# Patient Record
(demographics unavailable — no encounter records)

---

## 2025-03-26 NOTE — DISCUSSION/SUMMARY
[Add Food/Vitamin] : add ~M [Hepatitis B In Hospital] : Hepatitis B administered while in the hospital [No Vaccines] : no vaccines needed [FreeTextEntry1] : Well , father thinks getting less jaundiced.  Jaundice, Tc bili level 14. RTO right away if think getting more yellow.  RTO in 5 d if looking less yellow daily.  Reviewed jaundice in detail.   Nursing, supplement until next visit then to only br milk if can.   Mother to speak to her docs re  possible need for another MMR, and must get Varivax at some point   explained. Her doctors will advise.   Anticipatory guidance, safety reviewed in detail.  [place] safety handout given. Safe crib, back sleep. No soft bedding, no pillows, stuffed animals or fluffy items in crib.   Do not overdress. Do not swaddle after 1 mos old. No tight swaddling, do not swaddle legs. Do not put baby on adult bed or sofa, prevention of falls discussed. do not put pillows around baby. Keep in crib, or other safe location. Limit visitors.  No sick visitors.  Keep little kids away from .  Avoid contact with people with cold sores. Fever = rectal temp 100.4 or more, go to ER immediately for fever. If think  is sick, with or without a fever, see a doctor right away. No honey until after age 1 year old.  Feeding discussed in detail. Vitamin D 400 IU per day. Car seat use disc, proper use.  Always keep baby fully buckled when in car seat, whether in car or out of car.  Take out of car seat when home. Watch for head falling forward in car seat. Do not raise head of bed. Do not leave baby in swing or baby seat or car seat unobserved.  Take care that head cannot fall forward and cause trouble breathing. Take baby out and put on back on flat mattress in crib or bassinet when not directly observed. Smoke detector, CO detector, fire extinguisher in the kitchen advised, water temp < 125 F. Never shake a baby.

## 2025-03-26 NOTE — HISTORY OF PRESENT ILLNESS
[NO] : No [FreeTextEntry1] : Parents here 6 day old baby born 3/20/25 10:29 hrs.  Bili 8.3 below phototx level leaving hospital.  Seen after discharge 3 d ago,Total  bili 13.4 direct 0.4 on 3/23/25 2 AM, as parents concerned over jaundice. mother nursing, child latching and sucking well. Supplementing with formula as advised when went for bili level.  Reviewed all records of . BW 3.19 kg  7-0 DW  3010 g   6-10  -5.6 38-6  8 Peds to LDR for meconium, baby AGA , not a breech.  Mother 25 yo O+/O+/Linda neg HIV neg, RPR neg Hep B neg, GBS neg. Rubella unknown ( reviewed mother's prenatal labs on her phone with mom: rubella immune, mumps equivocal, measles immune, varicella NON immune, Hep B immune.  Mother to FU with her doctor re Varicella vaccine, and ?MMR.  Heb B vacc given to baby Mom no RSV vaccine.  Screens passed.  G6PD nl

## 2025-03-26 NOTE — PHYSICAL EXAM
[Alert] : alert [Normocephalic] : normocephalic [Flat Open Anterior [place]] : flat open anterior fontanelle [Icteric sclera] : icteric sclera [PERRL] : PERRL [Red Reflex Bilateral] : red reflex bilateral [Normally Placed Ears] : normally placed ears [Auricles Well Formed] : auricles well formed [Clear Tympanic membranes] : clear tympanic membranes [Light reflex present] : light reflex present [Bony structures visible] : bony structures visible [Patent Auditory Canal] : patent auditory canal [Nares Patent] : nares patent [Palate Intact] : palate intact [Uvula Midline] : uvula midline [Supple, full passive range of motion] : supple, full passive range of motion [Symmetric Chest Rise] : symmetric chest rise [Clear to Auscultation Bilaterally] : clear to auscultation bilaterally [Regular Rate and Rhythm] : regular rate and rhythm [S1, S2 present] : S1, S2 present [+2 Femoral Pulses] : +2 femoral pulses [Soft] : soft [Bowel Sounds] : bowel sounds present [Umbilical Stump Dry, Clean, Intact] : umbilical stump dry, clean, intact [Normal external genitailia] : normal external genitalia [Central Urethral Opening] : central urethral opening [Testicles Descended Bilaterally] : testicles descended bilaterally [Patent] : patent [Normally Placed] : normally placed [No Abnormal Lymph Nodes Palpated] : no abnormal lymph nodes palpated [Symmetric Flexed Extremities] : symmetric flexed extremities [Startle Reflex] : startle reflex present [Suck Reflex] : suck reflex present [Rooting] : rooting reflex present [Palmar Grasp] : palmar grasp present [Plantar Grasp] : plantar reflex present [Symmetric Marivel] : symmetric [place] [Jaundice] : jaundice [Nevus Flammeus] : nevus flammeus [Acute Distress] : no acute distress [Discharge] : no discharge [Palpable Masses] : no palpable masses [Murmurs] : no murmurs [Breast Tissue] : no prominent breast tissue [Tender] : nontender [Distended] : not distended [Hepatomegaly] : no hepatomegaly [Splenomegaly] : no splenomegaly [Circumcised] : not circumcised [Juan-Ortolani] : negative Juan-Ortolani [Spinal Dimple] : no spinal dimple [Tuft of Hair] : no tuft of hair [FreeTextEntry1] : NAD active, moderate jaundice [FreeTextEntry5] : RR++ minimal icteric [de-identified] : palate intact [FreeTextEntry8] : RR no murmur [FreeTextEntry9] : nl, umbil cord clean [FreeTextEntry6] : nl uncirc male, testes desc [de-identified] : Juan/Ortolani normal, Galeazzi test normal.  Leg length equal, creases symmetrical, no hip click or clunk. No MA or ITT. [de-identified] : nevus flammeus mid forehead, tiny on Lupper eye lid. no hemangiomas.

## 2025-03-31 NOTE — DISCUSSION/SUMMARY
[FreeTextEntry1] : Well 11 day old, jaundiced.  Had bili done in ER after discharged before last visit (13) Today TcB 14.5, last visit was 14. P Bili T and D now sent RTO next week bili check again Wt good.

## 2025-03-31 NOTE — PHYSICAL EXAM
[NL] : warm, clear [FreeTextEntry1] : alert active NAD, moderate jaundice [FreeTextEntry5] : RR++ mild icteric [FreeTextEntry8] : RR no murmur [FreeTextEntry6] : nl uncirc male, testes nl [de-identified] : Juan/Ortolani normal, Galeazzi test normal.  Leg length equal, creases symmetrical, no hip click or clunk. No MA or ITT.

## 2025-04-07 NOTE — PHYSICAL EXAM
[NL] : warm, clear [FreeTextEntry1] : mild jaundice, pink active NAD [FreeTextEntry8] : RR no murmur

## 2025-04-07 NOTE — DISCUSSION/SUMMARY
[FreeTextEntry1] : Well looking 18 day old.  Tc Bili now  RTO age 30 d if getting less yellow.  If gets more yellow RTO immediately. If not sure can RTO any day, call in am. Parents express understanding Wt good.  safety reviewed, safe crib and sleep.  Vit D samples given.

## 2025-04-23 NOTE — DISCUSSION/SUMMARY
[Normal Growth] : growth [Normal Development] : development  [No Elimination Concerns] : elimination [Continue Regimen] : feeding [No Skin Concerns] : skin [Normal Sleep Pattern] : sleep [None] : no medical problems [Anticipatory Guidance Given] : Anticipatory guidance addressed as per the history of present illness section [Age Approp Vaccines] : Age appropriate vaccines administered [No Medications] : ~He/She~ is not on any medications [Parent/Guardian] : Parent/Guardian [] : The components of the vaccine(s) to be administered today are listed in the plan of care. The disease(s) for which the vaccine(s) are intended to prevent and the risks have been discussed with the caretaker.  The risks are also included in the appropriate vaccination information statements which have been provided to the patient's caregiver.  The caregiver has given consent to vaccinate. [FreeTextEntry1] : Well 1 mos old, still jaundiced.  O+/O+/Linda neg.  Checked G6PD- nl NB screen nl  TcBili 11  Serum bili sent to be sure still normal  jaundice.   [place] screen 4 - disc in detail, mother does nto want counseling or therapist at this time, denies deprn. Advised self care.   Anticipatory guidance, safety reviewed in detail.  . Safe crib, back sleep. No soft bedding, no pillows, stuffed animals or fluffy items in crib.   Do not overdress. Do not swaddle after 1 mos old. No tight swaddling, do not swaddle legs. Do not put baby on adult bed or sofa, prevention of falls discussed. do not put pillows around baby. Keep in crib, or other safe location. Limit visitors.  No sick visitors.  Keep little kids away from .  Avoid contact with people with cold sores. Fever = rectal temp 100.4 or more, go to ER immediately for fever. If think  is sick, with or without a fever, see a doctor right away. No honey until after age 1 year old.  Feeding discussed in detail. Vitamin D 400 IU per day. Car seat use disc, proper use.  Always keep baby fully buckled when in car seat, whether in car or out of car.  Take out of car seat when home. Watch for head falling forward in car seat. Do not raise head of bed. Do not leave baby in swing or baby seat or car seat unobserved.  Take care that head cannot fall forward and cause trouble breathing. Take baby out and put on back on flat mattress in crib or bassinet when not directly observed. Smoke detector, CO detector, fire extinguisher in the kitchen advised, water temp < 125 F. Never shake a baby.   RTO one mos

## 2025-04-23 NOTE — HISTORY OF PRESENT ILLNESS
[Normal] : Normal [No] : No cigarette smoke exposure [Water heater temperature set at <120 degrees F] : Water heater temperature set at <120 degrees F [Rear facing car seat in back seat] : Rear facing car seat in back seat [Carbon Monoxide Detectors] : Carbon monoxide detectors at home [Smoke Detectors] : Smoke detectors at home. [NO] : No [At risk for exposure to TB] : Not at risk for exposure to Tuberculosis  [FreeTextEntry1] : One mos old.  Not a breech birth.  Breast  milk - mostly, nursing Formula - some Vit D given Safe bassinet, safe CS use.   Mother denies depression.  Rubella unknown mother on discharge record, mother says clarified with her OB and it was immune.

## 2025-05-21 NOTE — PHYSICAL EXAM
[Alert] : alert [Normocephalic] : normocephalic [Flat Open Anterior [place]] : flat open anterior fontanelle [PERRL] : PERRL [Red Reflex Bilateral] : red reflex bilateral [Normally Placed Ears] : normally placed ears [Auricles Well Formed] : auricles well formed [Clear Tympanic membranes] : clear tympanic membranes [Light reflex present] : light reflex present [Bony landmarks visible] : bony landmarks visible [Nares Patent] : nares patent [Palate Intact] : palate intact [Uvula Midline] : uvula midline [Supple, full passive range of motion] : supple, full passive range of motion [Symmetric Chest Rise] : symmetric chest rise [Clear to Auscultation Bilaterally] : clear to auscultation bilaterally [Regular Rate and Rhythm] : regular rate and rhythm [S1, S2 present] : S1, S2 present [+2 Femoral Pulses] : +2 femoral pulses [Soft] : soft [Bowel Sounds] : bowel sounds present [Normal external genitailia] : normal external genitalia [Central Urethral Opening] : central urethral opening [Testicles Descended Bilaterally] : testicles descended bilaterally [Normally Placed] : normally placed [No Abnormal Lymph Nodes Palpated] : no abnormal lymph nodes palpated [Symmetric Flexed Extremities] : symmetric flexed extremities [Startle Reflex] : startle reflex present [Suck Reflex] : suck reflex present [Rooting] : rooting reflex present [Palmar Grasp] : palmar grasp reflex present [Plantar Grasp] : plantar grasp reflex present [Symmetric Marivel] : symmetric [place] [Nervus Flammeus] : nevus flammeus [Acute Distress] : no acute distress [Discharge] : no discharge [Palpable Masses] : no palpable masses [Murmurs] : no murmurs [Breast Tissue] : no prominent breast tissue [Tender] : nontender [Distended] : not distended [Hepatomegaly] : no hepatomegaly [Splenomegaly] : no splenomegaly [Juan-Ortolani] : negative Juan-Ortolani [Spinal Dimple] : no spinal dimple [Tuft of Hair] : no tuft of hair [Rash and/or lesion present] : no rash/lesion [FreeTextEntry1] : NAD, mild jaundice over body, eyes not icteric, alert active  [FreeTextEntry5] : RR++ anicteric [FreeTextEntry8] : RR no murmur [FreeTextEntry9] : nl [FreeTextEntry6] : nl male [de-identified] : Juan/Ortolani normal, Galeazzi test normal.  Leg length equal, creases symmetrical, no hip click or clunk. No MA or ITT. [de-identified] : mild jaundice

## 2025-05-21 NOTE — DISCUSSION/SUMMARY
[Normal Growth] : growth [Normal Development] : development  [No Elimination Concerns] : elimination [Continue Regimen] : feeding [No Skin Concerns] : skin [Normal Sleep Pattern] : sleep [None] : no medical problems [Anticipatory Guidance Given] : Anticipatory guidance addressed as per the history of present illness section [Age Approp Vaccines] : Age appropriate vaccines administered [No Medications] : ~He/She~ is not on any medications [Parent/Guardian] : Parent/Guardian [] : The components of the vaccine(s) to be administered today are listed in the plan of care. The disease(s) for which the vaccine(s) are intended to prevent and the risks have been discussed with the caretaker.  The risks are also included in the appropriate vaccination information statements which have been provided to the patient's caregiver.  The caregiver has given consent to vaccinate. [FreeTextEntry1] : Well 2 mos old.  Still with diffuse mild jaundice, anicteric eyes.  Tc Bili 6.1 down from 11.  Labs done for persistent jaundice. NB screen nl. G6PD birth nl.  Vaccines given RTO immediately if becomes more yellow.  If stable RTO in one mos.   Anticipatory guidance, safety in detail.  Safe crib, back sleep. No soft bedding, no fluffy items in crib. No swaddling.  Do not overdress.  Pacifier use discussed, start after 1 month old if wanted.  Do not leave baby on adult bed or sofa, where baby might roll off.  No pillows around baby. Prevent falls.  No sick visitors.   Avoid contact with people with cold sores.   Fever = rectal temp 100.4 or more. Call us or come in to office for fever.   No honey until after age 1 year old.  Feeding discussed in detail.  No baby food until age 4 months at least.  Stop Vitamin D and start PVS Fe 1 ml a day. Store in safe place away from children.   Car seat use disc, proper use.  Always keep baby fully buckled when in car seat, whether in car or out of car. Do not raise head of bed. Do not leave baby in swing or baby seat or car seat unobserved.  Care that head cannot fall forward and cause trouble breathing. Take baby out and put on back on flat mattress in crib or bassinet when not directly observed.  Smoke detector, CO detector, FE in kitchen, water temp < 125 F.

## 2025-05-21 NOTE — PHYSICAL EXAM
[Alert] : alert [Normocephalic] : normocephalic [Flat Open Anterior [place]] : flat open anterior fontanelle [PERRL] : PERRL [Red Reflex Bilateral] : red reflex bilateral [Normally Placed Ears] : normally placed ears [Auricles Well Formed] : auricles well formed [Clear Tympanic membranes] : clear tympanic membranes [Light reflex present] : light reflex present [Bony landmarks visible] : bony landmarks visible [Nares Patent] : nares patent [Palate Intact] : palate intact [Uvula Midline] : uvula midline [Supple, full passive range of motion] : supple, full passive range of motion [Symmetric Chest Rise] : symmetric chest rise [Clear to Auscultation Bilaterally] : clear to auscultation bilaterally [Regular Rate and Rhythm] : regular rate and rhythm [S1, S2 present] : S1, S2 present [+2 Femoral Pulses] : +2 femoral pulses [Soft] : soft [Bowel Sounds] : bowel sounds present [Normal external genitailia] : normal external genitalia [Central Urethral Opening] : central urethral opening [Testicles Descended Bilaterally] : testicles descended bilaterally [Normally Placed] : normally placed [No Abnormal Lymph Nodes Palpated] : no abnormal lymph nodes palpated [Symmetric Flexed Extremities] : symmetric flexed extremities [Startle Reflex] : startle reflex present [Suck Reflex] : suck reflex present [Rooting] : rooting reflex present [Palmar Grasp] : palmar grasp reflex present [Plantar Grasp] : plantar grasp reflex present [Symmetric Marivel] : symmetric [place] [Nervus Flammeus] : nevus flammeus [Acute Distress] : no acute distress [Discharge] : no discharge [Palpable Masses] : no palpable masses [Murmurs] : no murmurs [Breast Tissue] : no prominent breast tissue [Tender] : nontender [Distended] : not distended [Hepatomegaly] : no hepatomegaly [Splenomegaly] : no splenomegaly [Juan-Ortolani] : negative Juan-Ortolani [Spinal Dimple] : no spinal dimple [Tuft of Hair] : no tuft of hair [Rash and/or lesion present] : no rash/lesion [FreeTextEntry1] : NAD, mild jaundice over body, eyes not icteric, alert active  [FreeTextEntry5] : RR++ anicteric [FreeTextEntry8] : RR no murmur [FreeTextEntry9] : nl [FreeTextEntry6] : nl male [de-identified] : Juan/Ortolani normal, Galeazzi test normal.  Leg length equal, creases symmetrical, no hip click or clunk. No MA or ITT. [de-identified] : mild jaundice

## 2025-05-21 NOTE — HISTORY OF PRESENT ILLNESS
[Mother] : mother [Breast milk] : breast milk [Formula ___ oz/feed] : [unfilled] oz of formula per feed [Vitamins ___] : Patient takes [unfilled] vitamins daily [Normal] : Normal [No] : No cigarette smoke exposure [Water heater temperature set at <120 degrees F] : Water heater temperature set at <120 degrees F [Rear facing car seat in back seat] : Rear facing car seat in back seat [Carbon Monoxide Detectors] : Carbon monoxide detectors at home [Smoke Detectors] : Smoke detectors at home. [At risk for exposure to TB] : Not at risk for exposure to Tuberculosis  [FreeTextEntry1] : 2 mos old infant. Sees follows hears coos. Interacts, smiles.   Safe bassinet or crib. Uses car seat, facing back of car.   Diet-  breast milk mainly, formula  Vitamins - D  Mother not depressed, more anxious about baby, general welfare not specifics. Discussed.   STill yellow mom says.

## 2025-05-21 NOTE — DEVELOPMENTAL MILESTONES
[Passed] : passed [Yes] : Completed. [FreeTextEntry1] : disc in detail, mom says not depressed, does not want therapy at this time.  [FreeTextEntry2] : 7

## 2025-07-29 NOTE — DISCUSSION/SUMMARY
[Normal Growth] : growth [Normal Development] : development  [No Elimination Concerns] : elimination [Continue Regimen] : feeding [No Skin Concerns] : skin [Normal Sleep Pattern] : sleep [None] : no medical problems [Anticipatory Guidance Given] : Anticipatory guidance addressed as per the history of present illness section [Age Approp Vaccines] : Age appropriate vaccines administered [No Medications] : ~He/She~ is not on any medications [Parent/Guardian] : Parent/Guardian [] : The components of the vaccine(s) to be administered today are listed in the plan of care. The disease(s) for which the vaccine(s) are intended to prevent and the risks have been discussed with the caretaker.  The risks are also included in the appropriate vaccination information statements which have been provided to the patient's caregiver.  The caregiver has given consent to vaccinate. [FreeTextEntry1] : Growth good. well 4 mos old, no jaundice, nl PE.  Did well with past vaccines.  Vaccines given by LPN.   Repeat CMP, neobili  done by MD, blood sent to lab  Feeding disc, can wait until 5-6 mos. handouts given. Feeding, allergenic foods disc.   Safety, anticipatory guidance in detail.  Safe crib, no fluffy items in crib, no stuffed animals in crib. If want bumpers, only mesh ones. No cords or strings near crib. No mobiles in crib once child sits up.  Sleep training discussed. Aim is 12 hours of sleep with no feeding at night.  No honey until after age 1 year.  Prevent falls, do not leave on adult beds or sofas.  Feeding discussed.  Allergenic food introduction discussed, very small amounts first 4 times.  Disc reactions, what to do if has an allergic reaction.   Choking prevention.  Fluoridated water.  Multi vitamins with iron, store this and all medication safely away from kids.  Car seat use, always fully buckled in properly when in use, whether in car or out of car. Do not raise head of bed. Do not leave baby in swing or baby seat or car seat unobserved.  Care that head cannot fall forward and cause trouble breathing. Take baby out and put on back on flat mattress in crib or bassinet when not directly observed.   Smoke detector, CO detector, FE in kitchen.

## 2025-07-29 NOTE — HISTORY OF PRESENT ILLNESS
[Well-balanced] : well-balanced [Normal] : Normal [No] : No cigarette smoke exposure [Water heater temperature set at <120 degrees F] : Water heater temperature set at <120 degrees F [Rear facing car seat in back seat] : Rear facing car seat in back seat [Carbon Monoxide Detectors] : Carbon monoxide detectors at home [Smoke Detectors] : Smoke detectors at home. [FreeTextEntry1] : 4 mos old baby. Hears, babbles. Interacts well, good eye contact. Smiles laughs. Follows. Rolls. Safe crib, safe car seat use.  Mainly breast milk, some formula.  PVS Fe. store safely.

## 2025-07-29 NOTE — PHYSICAL EXAM
[Alert] : alert [Normocephalic] : normocephalic [Flat Open Anterior [place]] : flat open anterior fontanelle [Red Reflex] : red reflex bilateral [PERRL] : PERRL [Normally Placed Ears] : normally placed ears [Auricles Well Formed] : auricles well formed [Clear Tympanic membranes] : clear tympanic membranes [Light reflex present] : light reflex present [Bony landmarks visible] : bony landmarks visible [Nares Patent] : nares patent [Palate Intact] : palate intact [Uvula Midline] : uvula midline [Symmetric Chest Rise] : symmetric chest rise [Clear to Auscultation Bilaterally] : clear to auscultation bilaterally [Regular Rate and Rhythm] : regular rate and rhythm [S1, S2 present] : S1, S2 present [+2 Femoral Pulses] : (+) 2 femoral pulses [Soft] : soft [Bowel Sounds] : bowel sounds present [Central Urethral Opening] : central urethral opening [Testicles Descended] : testicles descended bilaterally [Patent] : patent [Normally Placed] : normally placed [No Abnormal Lymph Nodes Palpated] : no abnormal lymph nodes palpated [Startle Reflex] : startle reflex present [Plantar Grasp] : plantar grasp reflex present [Symmetric Marivel] : symmetric [place] [Acute Distress] : no acute distress [Discharge] : no discharge [Palpable Masses] : no palpable masses [Murmurs] : no murmurs [Tender] : nontender [Distended] : nondistended [Hepatomegaly] : no hepatomegaly [Splenomegaly] : no splenomegaly [Juan-Ortolani] : negative Juan-Ortolani [Allis Sign] : negative Allis sign [Spinal Dimple] : no spinal dimple [Tuft of Hair] : no tuft of hair [Rash or Lesions] : no rash/lesions [FreeTextEntry1] : NAD [FreeTextEntry5] : RR++ LR= [FreeTextEntry8] : RR no murmur [de-identified] : testes desc bilat [de-identified] : Juan/Ortolani normal, Galeazzi test normal.  Leg length equal, creases symmetrical, no hip click or clunk. No MA or ITT.